# Patient Record
Sex: FEMALE | URBAN - METROPOLITAN AREA
[De-identification: names, ages, dates, MRNs, and addresses within clinical notes are randomized per-mention and may not be internally consistent; named-entity substitution may affect disease eponyms.]

---

## 2019-05-28 ENCOUNTER — APPOINTMENT (OUTPATIENT)
Age: 34
Setting detail: DERMATOLOGY
End: 2019-05-29

## 2019-05-28 DIAGNOSIS — D22 MELANOCYTIC NEVI: ICD-10-CM

## 2019-05-28 PROBLEM — D22.62 MELANOCYTIC NEVI OF LEFT UPPER LIMB, INCLUDING SHOULDER: Status: ACTIVE | Noted: 2019-05-28

## 2019-05-28 PROCEDURE — OTHER ADDITIONAL NOTES: OTHER

## 2019-05-28 PROCEDURE — 99213 OFFICE O/P EST LOW 20 MIN: CPT

## 2019-05-28 ASSESSMENT — LOCATION ZONE DERM: LOCATION ZONE: ARM

## 2019-05-28 ASSESSMENT — LOCATION SIMPLE DESCRIPTION DERM: LOCATION SIMPLE: LEFT SHOULDER

## 2019-05-28 ASSESSMENT — LOCATION DETAILED DESCRIPTION DERM: LOCATION DETAILED: LEFT ANTERIOR SHOULDER

## 2019-05-28 NOTE — HPI: SKIN LESION
Additional History: See bx [id number] Lesion was biopsied and pt was to return in 4 weeks. Present lesion has regrown at site of previous bx Additional History: See bx [id number] Lesion was biopsied and pt was to return in 4 weeks. Present lesion has regrown at site of previous bx

## 2019-05-28 NOTE — PROCEDURE: ADDITIONAL NOTES
Additional Notes: Patient had spot biopsied 5/10/17 and was diagnosed with intradermal nevus with lipomatous change and was instructed to have it rechecked for regrowth in 4 weeks. Patient states it regrew but did not followup due to anxiety of procedure. Instructed patient to have lesion rebiopsied today due to regrowth and enlargement. Patient declines procedure at this time due to anxiety of procedure. Instructed patient to make followup for removal as soon as possible. Patient states verbal understanding.
Detail Level: Simple